# Patient Record
Sex: MALE | Race: BLACK OR AFRICAN AMERICAN | NOT HISPANIC OR LATINO | Employment: STUDENT | URBAN - METROPOLITAN AREA
[De-identification: names, ages, dates, MRNs, and addresses within clinical notes are randomized per-mention and may not be internally consistent; named-entity substitution may affect disease eponyms.]

---

## 2020-03-14 ENCOUNTER — OFFICE VISIT (OUTPATIENT)
Dept: URGENT CARE | Facility: URGENT CARE | Age: 20
End: 2020-03-14
Payer: COMMERCIAL

## 2020-03-14 ENCOUNTER — VIRTUAL VISIT (OUTPATIENT)
Dept: FAMILY MEDICINE | Facility: OTHER | Age: 20
End: 2020-03-14

## 2020-03-14 DIAGNOSIS — R05.9 COUGH: Primary | ICD-10-CM

## 2020-03-14 LAB
FLUAV+FLUBV AG SPEC QL: NEGATIVE
FLUAV+FLUBV AG SPEC QL: NEGATIVE
SPECIMEN SOURCE: NORMAL

## 2020-03-14 PROCEDURE — 87804 INFLUENZA ASSAY W/OPTIC: CPT | Performed by: NURSE PRACTITIONER

## 2020-03-14 PROCEDURE — 99202 OFFICE O/P NEW SF 15 MIN: CPT | Performed by: NURSE PRACTITIONER

## 2020-03-14 ASSESSMENT — ENCOUNTER SYMPTOMS
SORE THROAT: 1
COUGH: 1
HEADACHES: 1
CHILLS: 1

## 2020-03-14 NOTE — PATIENT INSTRUCTIONS
You are being tested for Corona Virus 19, Influenza and possibly RSV.    Please use the information at the end of this document to sign up for [facility] where you can get your results and a message about those results sent to you through the [employer] application. If you do not have mychart we will call you with your results but it may take longer.    Isolate Yourself:    Isolate yourself while traveling.    Do Not allow any visitors within 6 feet.    Do Not go to work or school.    Do Not go to [employer],  centers, shopping, or other public places.    Do Not shake hands.    Avoid close contact with others (hugging, kissing).    Protect Others:    Cover Your Mouth and Nose with a mask, disposable tissue or wash cloth to avoid spreading germs to others.    Wash your hands and face frequently with soap and water    Call Back If: Breathing difficulty develops or you become worse.    For more information about COVID19 and options for caring for yourself at home, please visit the CDC website at https://www.cdc.gov/coronavirus/2019-ncov/about/steps-when-sick.html  For more options for care at [facility], please visit our website at [email or web address]

## 2020-03-14 NOTE — PROGRESS NOTES
"Date: 2020 12:30:45  Clinician: Armida Noel  Clinician NPI: [id number]  Patient: Colby Little  Patient : 2000  Patient Address: [address] [place] [address]  Patient Phone: [phone]  Visit Protocol: URI  Patient Summary:  Colby is a 19 year old ( : 2000 ) male who initiated a Visit for COVID-19 (Coronavirus) evaluation and screening. When asked the question \"Please sign me up to receive news, health information and promotions from [employer].\", Colby responded \"Yes\".    Colby states his symptoms started 1-2 days ago.   His symptoms consist of malaise, a headache, rhinitis, myalgia, chills, a sore throat, a cough, and nasal congestion. Colby also feels feverish but was unable to measure his temperature.   Symptom details     Nasal secretions: The color of his mucus is green.    Cough: Colby coughs a few times an hour and his cough is more bothersome at night. Phlegm comes into his throat when he coughs. He does not believe his cough is caused by post-nasal drip. The color of the phlegm is green.     Sore throat: Colby reports having moderate throat pain (4-6 on a 10 point pain scale), does not have exudate on his tonsils, and can swallow liquids. He is not sure if the lymph nodes in his neck are enlarged. A rash has not appeared on the skin since the sore throat started.     Headache: He states the headache is mild (1-3 on a 10 point pain scale).      Colby denies having teeth pain, ear pain, facial pain or pressure, and wheezing. He also denies having recent facial or sinus surgery in the past 60 days, having a sinus infection within the past year, and taking antibiotic medication for the symptoms. He is not experiencing dyspnea.   Precipitating events  Colby is not sure if he has been exposed to someone with strep throat. He has not recently been exposed to someone with influenza. Colby has been in close contact with the following high risk individuals: adults 65 or " older.   Pertinent COVID-19 (Coronavirus) information  Colby has traveled internationally or to the areas where COVID-19 (Coronavirus) is widespread in the last 14 days before the start of his symptoms. Countries or locations traveled as reported by the patient (free text): [place], [place]   Colby has not had close contact with a suspected or laboratory-confirmed COVID-19 patient within 14 days of symptom onset.   Colby is not a healthcare worker or does not work in a healthcare facility.   Pertinent medical history  Colby needs a return to work/school note.   Weight: 175 lbs   Colby does not smoke or use smokeless tobacco.   Weight: 175 lbs    MEDICATIONS: No current medications, ALLERGIES: NKDA  Clinician Response:  Dear Colby,   Dear Colby Little,  Based on the information you have provided, it is recommended that you go to one of our designated Corona Virus 19 testing centers to get a test done from your car. To do this follow these instructions:  You should go to one of our dedicated testing centers as soon as possible during the hours below at one of these locations:   Walk[facility]in Care: [facility] at [address] [address]. Hours: M-F 7am - 6pm, Sat-Sun 8am -- 3pm   [facility] at [address] [address]. Hours: Every Day 9am -- 7pm  Walk-in Care: [facility] at [address] [place] [address]. Hours: M-F 7am - 6pm, Sat-Sun 8am -- 3pm  [facility] [place] [address] [place] [address]. Hours: M-F 11am -- 7pm, Sat-Sun 9am-4pm   What to expect:   When you arrive please come park in the parking lot.  Call [phone] and let them know which of the four clinics you are at, description of your car and where you are parked. Mention you did an [facility] visit and were sent for testing.  They will add you to the queue to get your test (you will stay in your car the entire time).  On that phone  call you will give them the information to register your for the visit.  You will then be met by a provider who will perform a brief assessment in your car and collect samples to send for Corona Virus 19, influenza and possibly RSV.  You will be given patient information about respiratory illnesses and instructions. You with the results if you are not on mychart.   Isolate Yourself:   Isolate yourself while traveling.  Do Not allow any visitors within 6 feet.  Do Not go to work or school.  Do Not go to [employer],  centers, shopping, or other public places.  Do Not shake hands.  Avoid close contact with others (hugging, kissing).  Protect Others:  Cover Your Mouth and Nose with a mask, disposable tissue or wash cloth to avoid spreading germs to others.  Wash your hands and face frequently with soap and water   Fever Medicines:   For fever relief, take acetaminophen or ibuprofen.  Treat fevers above 101deg F (38.3deg C) to lower fevers and make you more comfortable.   Acetaminophen (e.g., Tylenol): Take 650 mg (two 325 mg pills) by mouth every 4-6 hours as needed of regular strength Tylenol or 1,000 mg (two 500 mg pills) every 8 hours as needed of Extra Strength Tylenol.   Ibuprofen (e.g., Motrin, Advil): Take 400 mg (two 200 mg pills) by mouth every 6 hours as needed.   Acetaminophen is thought to be safer than ibuprofen or naproxen for people over 65 years old. Acetaminophen is in many OTC and prescription medicines. It might be in more than one medicine that you are taking. You need to be careful and not take an overdose. Before taking any medicine, read all the instructions on the package.  Caution -NSAIDs (e.g., ibuprofen, naproxen): Do not take nonsteroidal anti-inflammatory drugs (NSAIDs) if you have stomach problems, kidney disease, heart failure, or other contraindications to using this type of medicine. Do not take NSAID medicines for over 7 days without consulting your PCP. Do not take NSAID  medicines if you are pregnant. Do not take NSAID medicines if you are also taking blood thinners.   Call Back If: Breathing difficulty develops or you become worse.  Thank you for limiting contact with others, wearing a simple mask to cover your cough, practice good hand hygiene habits and accessing our virtual services where possible to limit the spread of this virus.  For more information about COVID19 and options for caring for yourself at home, please visit the CDC website at https://www.cdc.gov/coronavirus/2019-ncov/about/steps-when-sick.html  For more options for care at [facility], please visit our website at [email or web address]    Diagnosis: Cough  Diagnosis ICD: R05

## 2020-03-15 NOTE — PROGRESS NOTES
SUBJECTIVE: Colby Little is a 19 year old male presenting with a chief complaint of coronavirus exposure. He is COVID-19 Screened patient. Reports travel to [place] and [facility].     Review of Systems   Constitutional: Positive for chills.   HENT: Positive for sore throat.    Respiratory: Positive for cough.    Neurological: Positive for headaches.       No past medical history on file.  No family history on file.  No current outpatient medications on file.     Social History     Tobacco Use     Smoking status: Not on file   Substance Use Topics     Alcohol use: Not on file       OBJECTIVE  Physical Exam  Constitutional:       General: He is not in acute distress.     Appearance: Normal appearance. He is not ill-appearing, toxic-appearing or diaphoretic.   Pulmonary:      Effort: Pulmonary effort is normal. No respiratory distress.   Neurological:      General: No focal deficit present.      Mental Status: He is alert and oriented to person, place, and time.   Psychiatric:         Behavior: Behavior normal.           ASSESSMENT: Provided to patient    ICD-10-CM    1. Cough  R05 Influenza A & B Antigen     COVID-19 Virus (Coronavirus), PCR - Nasopharyngeal (NP) Swab in [place]        PLAN: Provided to patient.   Patient Instructions   You are being tested for Corona Virus 19, Influenza and possibly RSV.    Please use the information at the end of this document to sign up for  [employer] where you can get your results and a message about those results sent to you through the [employer] application. If you do not have [employer] we will call you with your results but it may take longer.    Isolate Yourself:    Isolate yourself while traveling.    Do Not allow any visitors within 6 feet.    Do Not go to work or school.    Do Not go to [employer],  centers, shopping, or other public places.    Do Not shake hands.    Avoid close contact with others (hugging, kissing).    Protect Others:    Cover Your Mouth  and Nose with a mask, disposable tissue or wash cloth to avoid spreading germs to others.    Wash your hands and face frequently with soap and water    Call Back If: Breathing difficulty develops or you become worse.    For more information about COVID19 and options for caring for yourself at home, please visit the CDC website at https://www.cdc.gov/coronavirus/2019-ncov/about/steps-when-sick.html  For more options for care at [facility], please visit our website at [email or web address]

## 2020-03-20 ENCOUNTER — TELEPHONE (OUTPATIENT)
Dept: URGENT CARE | Facility: URGENT CARE | Age: 20
End: 2020-03-20

## 2020-03-20 NOTE — TELEPHONE ENCOUNTER
Reason for Call:  Request for results:    Name of test or procedure: corona    Date of test of procedure: [id number]    Location of the test or procedure:     OK to leave the result message on voice mail or with a family member? YES    Phone number Patient can be reached at:  Home number on file [phone] (home)    Additional comments: none    Call taken on 3/20/2020 at 4:26 PM by Belkis Murillo

## 2020-03-23 ENCOUNTER — TELEPHONE (OUTPATIENT)
Dept: EMERGENCY MEDICINE | Facility: CLINIC | Age: 20
End: 2020-03-23

## 2020-03-23 LAB — COVID-19 VIRUS PCR RESULT FROM MDH: NEGATIVE

## 2020-03-23 NOTE — TELEPHONE ENCOUNTER
Coronavirus (COVID-19) Notification    Reason for call  Notify of Negative COVID-19 lab result, assess symptoms,  review [facility] recommendations    Lab Result    Lab test 2019-nCoV rRt-PCR  Oropharyngeal AND/OR nasopharyngeal swabs were NEGATIVE for 2019-nCoV RNA    RN Recommendations/Instructions per [facility]  Patient notified of Negative COVID-19.    Patient can discontinue Quarantee and is free to resume normal activites.      [RN/LPN Name]  Binu Tuttle RN  [facility] [employer] [facility] - [facility]  Emergency Dept Lab Result RN  Ph# [phone]

## 2020-03-23 NOTE — TELEPHONE ENCOUNTER
Coronavirus (COVID-19) Notification    Reason for call  Notify of Negative COVID-19 lab result, assess symptoms,  review [facility] recommendations    Lab Result   Lab test 2019-nCoV rRt-PCR  Oropharyngeal AND/OR nasopharyngeal swabs were NEGATIVE for 2019-nCoV RNA    Unable to reach Patient by phone at this time  Left voicemail message requesting a call back between 10A to 6:30P to [facility] Result phone line at [phone].         [RN/LPN Name]  Tammie Severson, LPN

## 2020-07-26 ENCOUNTER — APPOINTMENT (OUTPATIENT)
Dept: GENERAL RADIOLOGY | Facility: CLINIC | Age: 20
End: 2020-07-26
Attending: PHYSICIAN ASSISTANT
Payer: COMMERCIAL

## 2020-07-26 ENCOUNTER — HOSPITAL ENCOUNTER (EMERGENCY)
Facility: CLINIC | Age: 20
Discharge: HOME | End: 2020-07-26
Attending: PHYSICIAN ASSISTANT | Admitting: PHYSICIAN ASSISTANT
Payer: COMMERCIAL

## 2020-07-26 VITALS
SYSTOLIC BLOOD PRESSURE: 129 MMHG | DIASTOLIC BLOOD PRESSURE: 71 MMHG | OXYGEN SATURATION: 99 % | TEMPERATURE: 98.5 F | HEIGHT: 76 IN | WEIGHT: 187 LBS | HEART RATE: 75 BPM | RESPIRATION RATE: 16 BRPM | BODY MASS INDEX: 22.77 KG/M2

## 2020-07-26 DIAGNOSIS — M25.572 PAIN IN JOINT INVOLVING ANKLE AND FOOT, LEFT: ICD-10-CM

## 2020-07-26 PROCEDURE — 99284 EMERGENCY DEPT VISIT MOD MDM: CPT

## 2020-07-26 PROCEDURE — 73610 X-RAY EXAM OF ANKLE: CPT | Mod: LT

## 2020-07-26 PROCEDURE — 73630 X-RAY EXAM OF FOOT: CPT | Mod: LT

## 2020-07-26 PROCEDURE — 25000132 ZZH RX MED GY IP 250 OP 250 PS 637: Performed by: PHYSICIAN ASSISTANT

## 2020-07-26 RX ORDER — IBUPROFEN 600 MG/1
600 TABLET, FILM COATED ORAL ONCE
Status: COMPLETED | OUTPATIENT
Start: 2020-07-26 | End: 2020-07-26

## 2020-07-26 RX ADMIN — IBUPROFEN 600 MG: 600 TABLET ORAL at 16:10

## 2020-07-26 ASSESSMENT — MIFFLIN-ST. JEOR: SCORE: 1954.73

## 2020-07-26 ASSESSMENT — ENCOUNTER SYMPTOMS
NUMBNESS: 1
ARTHRALGIAS: 1

## 2020-07-26 NOTE — ED PROVIDER NOTES
"  History   Chief Complaint:  Ankle Pain     HPI   Colby Little is a 20 year old male who presents for evaluation of left ankle pain that occurred while playing basketball prior to arrival. The patient states he was playing basketball with his friends when he rolled his left ankle. He believes his foot inverted while he rolled his ankle. He did not fall, but he heard a \"crack\". Since this occurred, he has had left foot numbness and he has been unable to bear weight. Due to his pain, he presented for evaluation.    Here, the patient denies taking any medication for his pain prior to arrival. He denies any knee pain or symptoms prompting his presentation.     Allergies:  No Known Drug Allergies      Medications:    The patient is not currently taking any prescribed medications.     Past Medical History:    The patient denies any relevant past medical history.     Past Surgical History:    Tonsillectomy  Adenoidectomy     Family History:    The patient denies any relevant family medical history.     Social History:  The patient was accompanied to the ED by friend.  Smoking Status: Never  Smokeless Tobacco: Never  Alcohol Use: No  Drug Use: No      Review of Systems   Musculoskeletal: Positive for arthralgias.   Neurological: Positive for numbness.   All other systems reviewed and are negative.      Physical Exam     Patient Vitals for the past 24 hrs:   BP Temp Pulse Resp SpO2 Height Weight   07/26/20 1552 139/76 98.5  F (36.9  C) 86 18 98 % 1.93 m (6' 4\") 84.8 kg (187 lb)     Physical Exam  HENT: mmm  Eyes: PERRL B/L  Neck: Supple  CV: Peripheral pulses in tact and regular  Resp: Speaking in full sentences without any respiratory distress  Ext:  Left ANKLE     No TTP over the inferior 6 cm of the posterior medial malleolus  TTP over the inferior 6 cm of the posterior lateral malleolus  No TTP over the navicular.  TTP base of 5th MT  No TTP fibular head  Soft tissue swelling present over lateral malleolus  This is a " closed injury  able to fire foot/toe flexors and extensors, ankle with pain  Sensation and perfusion intact throughout foot     Remainder of the skeletal survey is unremarkable     Skin: warm dry well perfused  Neuro: Alert, no gross motor or sensory deficits  Psych: Calm  Nursing notes and vital signs reviewed.  Emergency Department Course   Imaging:  Radiology findings were communicated with the patient who voiced understanding of the findings.    XR Ankle Left G/E 3 Views  IMPRESSION: Ankle lateral swelling. Ligament injury is not excluded.  No apparent ankle or foot fracture. The ankle mortise appears  congruent.  Reading per radiology.     Foot XR, G/E 3 Views, Left   IMPRESSION: Ankle lateral swelling. Ligament injury is not excluded.  No apparent ankle or foot fracture. The ankle mortise appears  congruent.  Reading per radiology.     Interventions:  1610 Advil 600 mg PO    Emergency Department Course:  Past medical records, nursing notes, and vitals reviewed.  The patient was sent for a XR Ankle Left G/E 3 Views, Foot XR, G/E 3 Views, Left while in the emergency department, results above.      (8351)   I performed an exam of the patient as documented above. History obtained from patient.     (4173)   I rechecked the patient and discussed results and plan of care.     Findings and plan explained to the Patient. Patient discharged home with instructions regarding supportive care, medications, and reasons to return. The importance of close follow-up was reviewed. I personally reviewed the imaging results with the Patient and answered all related questions prior to discharge.     Impression & Plan     Medical Decision Making:  Colby Little is a 20 year old male who presents for evaluation of ankle pain.  Signs and symptoms are consistent with an ankle sprain.  XR of foot and ankle negative for acute bony abnormality. No signs of septic arthritis, gout, pseudogout, cellulitis, etc.   The patients neurovascular  status is normal. A head to toe trauma exam is otherwise negative; the likelihood of other serious sequelae of trauma (spine, head, chest, abdomen, other extremities, pelvis) is low.  Plan is aircast, crutches, RICE treatment with ice 15 minutes on, 1 hour off.  F/u with ortho as needed. Discussed reasons to return. All questions answered. Patient discharged to home in stable condition.    Diagnosis:    ICD-10-CM    1. Pain in joint involving ankle and foot, left  M25.572      Disposition:  Discharged to home.     Scribe Disclosure:  I, Dillan Medina, am serving as a scribe at 3:54 PM on 7/26/2020 to document services personally performed by Kirs Joyce PA-C based on my observations and the provider's statements to me.    I, Ros Cheney, am serving as a scribe at 4:45 PM on 7/26/2020 to document services personally performed by Kris Joyce PA-C based on my observations and the provider's statements to me.   July 26, 2020   [facility] EMERGENCY DEPARTMENT    This record was created at least in part using electronic voice recognition software, so please excuse any typographical errors.       Kris Joyce PA-C  07/26/20 2052

## 2020-07-26 NOTE — ED AVS SNAPSHOT
Emergency Department  [address] [facility] [address]  Phone:  [phone]  Fax:  [phone]                                    Colby Little   MRN: [id number]    Department:   Emergency Department   Date of Visit:  7/26/2020           After Visit Summary Signature Page    I have received my discharge instructions, and my questions have been answered. I have discussed any challenges I see with this plan with the nurse or doctor.    ..........................................................................................................................................  Patient/Patient Representative Signature      ..........................................................................................................................................  Patient Representative Print Name and Relationship to Patient    ..................................................               ................................................  Date                                   Time    ..........................................................................................................................................  Reviewed by Signature/Title    ...................................................              ..............................................  Date                                               Time          22EPIC Rev 08/18

## 2020-11-22 ENCOUNTER — HEALTH MAINTENANCE LETTER (OUTPATIENT)
Age: 20
End: 2020-11-22

## 2021-03-18 ENCOUNTER — HOSPITAL ENCOUNTER (EMERGENCY)
Facility: CLINIC | Age: 21
Discharge: HOME | End: 2021-03-18
Attending: EMERGENCY MEDICINE | Admitting: EMERGENCY MEDICINE
Payer: COMMERCIAL

## 2021-03-18 ENCOUNTER — APPOINTMENT (OUTPATIENT)
Dept: GENERAL RADIOLOGY | Facility: CLINIC | Age: 21
End: 2021-03-18
Attending: EMERGENCY MEDICINE
Payer: COMMERCIAL

## 2021-03-18 VITALS
SYSTOLIC BLOOD PRESSURE: 126 MMHG | DIASTOLIC BLOOD PRESSURE: 76 MMHG | HEART RATE: 76 BPM | TEMPERATURE: 98.7 F | OXYGEN SATURATION: 98 % | RESPIRATION RATE: 20 BRPM

## 2021-03-18 DIAGNOSIS — S10.93XA CONTUSION OF FACE, SCALP AND NECK, INITIAL ENCOUNTER: ICD-10-CM

## 2021-03-18 DIAGNOSIS — S00.83XA CONTUSION OF FACE, SCALP AND NECK, INITIAL ENCOUNTER: ICD-10-CM

## 2021-03-18 DIAGNOSIS — R68.84 JAW PAIN: Primary | ICD-10-CM

## 2021-03-18 DIAGNOSIS — S09.90XA CLOSED HEAD INJURY, INITIAL ENCOUNTER: ICD-10-CM

## 2021-03-18 DIAGNOSIS — S00.03XA CONTUSION OF FACE, SCALP AND NECK, INITIAL ENCOUNTER: ICD-10-CM

## 2021-03-18 PROCEDURE — 70330 X-RAY EXAM OF JAW JOINTS: CPT

## 2021-03-18 PROCEDURE — 250N000013 HC RX MED GY IP 250 OP 250 PS 637: Performed by: EMERGENCY MEDICINE

## 2021-03-18 PROCEDURE — 99283 EMERGENCY DEPT VISIT LOW MDM: CPT

## 2021-03-18 RX ORDER — ACETAMINOPHEN 500 MG
1000 TABLET ORAL ONCE
Status: COMPLETED | OUTPATIENT
Start: 2021-03-18 | End: 2021-03-18

## 2021-03-18 RX ORDER — IBUPROFEN 600 MG/1
600 TABLET, FILM COATED ORAL ONCE
Status: COMPLETED | OUTPATIENT
Start: 2021-03-18 | End: 2021-03-18

## 2021-03-18 RX ADMIN — IBUPROFEN 600 MG: 600 TABLET, FILM COATED ORAL at 14:05

## 2021-03-18 RX ADMIN — ACETAMINOPHEN 1000 MG: 500 TABLET, FILM COATED ORAL at 14:05

## 2021-03-18 ASSESSMENT — ENCOUNTER SYMPTOMS
SHORTNESS OF BREATH: 0
TROUBLE SWALLOWING: 0
WOUND: 1
NUMBNESS: 0

## 2021-03-18 NOTE — ED TRIAGE NOTES
Pt presents to ED via Triage for evaluation of Jaw Pain. Per pt, playing basketball this afternoon and was elbowed in the jaw. Pt reports laceration on left corner of mouth from biting lip. Pt reports unable to close his jaw.

## 2021-03-18 NOTE — ED PROVIDER NOTES
History   Chief Complaint:  Jaw Pain    HPI   Colby Little is a 20 year old male, who presents to the ED for evaluation of jaw pain. The patient reports he was playing basketball this afternoon and was accidentally struck in the right side of the jaw. He states since the incident he has been unable to close his mouth due to intense pain. He does not have any difficulty swallowing or breathing. He does not feel that he has any dental abnormalities nor does he have any hearing loss. The patient did not lose consciousness. He says he bit the inside of his cheek when he was hit. He has no numbness. He denies any other complaints.     Review of Systems   HENT: Negative for dental problem, hearing loss and trouble swallowing.         Jaw pain   Respiratory: Negative for shortness of breath.    Skin: Positive for wound (Inside cheek).   Neurological: Negative for syncope and numbness.   All other systems reviewed and are negative.    Allergies:  No known drug allergies    Medications:    The patient is not currently taking any prescribed medications.    Past Medical History:    The patient denies past medical history.     Past Surgical History:    The patient denies past surgical history.     Family History:    The patient denies past family history.     Social History:  PCP: [facility]  Presents to the ED alone    Physical Exam     Patient Vitals for the past 24 hrs:   BP Temp Temp src Pulse Resp SpO2   03/18/21 1257 126/76 98.7  F (37.1  C) Temporal 76 20 98 %     Physical Exam  General: Alert, appears well-developed and well-nourished. Cooperative.     In mild distress  HEENT:  Head:  Atraumatic  Ears:  External ears are normal  Mouth/Throat:  Oropharynx is without erythema or exudate and mucous membranes are moist. No dental trauma.  Mild bite injury to the left buccal mucosa.  Bleeding controlled.  No gaping laceration intraorally.  Right TMJ is mildly tender to palpation but no dislocation  appreciated clinically.  Bite appears normal.  Eyes:   Conjunctivae normal and EOM are normal. No scleral icterus.  CV:  Normal rate, regular rhythm, normal heart sounds and radial pulses are 2+ and symmetric.  No murmur.  Resp:  Breath sounds are clear bilaterally    Non-labored, no retractions or accessory muscle use  GI:  Abdomen is soft, no distension, no tenderness. No rebound or guarding.  No CVA tenderness bilaterally  MS:  Normal range of motion. No edema.    Normal strength in all 4 extremities.     Back atraumatic.    No midline cervical, thoracic, or lumbar tenderness  Skin:  Warm and dry.  No rash or lesions noted.  Neuro: Alert. Normal strength.  GCS: 15  Psych:  Normal mood and affect.    Emergency Department Course   Imaging:    XR Mandible 1/3 view:  No fractures. Temporomandibular joint alignment   bilaterally is normal. There is normal rotation and anterior   translocation of the condylar heads bilaterally with respect to the   articular eminences of the temporal bones.     Imaging independently reviewed and agree with radiologist interpretation.      Emergency Department Course:    Reviewed:  I reviewed the patient's nursing notes, vitals, past available medical records.     Assessments:  1342: I obtained history and examined the patient as noted above.     1500: I rechecked the patient and explained findings. Amendable to plan.    Interventions:  1405: Tylenol 1000 mg PO  1405: Ibuprofen 1000 mg PO     Disposition:  The patient was discharged to home.    Impression & Plan    Medical Decision Making:  Patient is a 20-year-old male who was playing basketball when he was elbowed in the right cheek.  He presents with jaw pain.  Reassuringly no loss of consciousness or syncopal event.  Patient has no dental trauma but does have a small bite injury to the buccal mucosa of the left cheek.  Reassuringly no fractured teeth.  He has no clinical dislocation at the temporomandibular joint bilaterally  although we did obtain x-rays.  Reassuringly no fractures and TMJ appears normal bilaterally.  Patient was able to pass the tongue depressor test with bite bilaterally.  We discussed use of soft foods over the next 24 to 48 hours with close follow-up with his dentist for recheck next week.  Return precautions understood and all questions answered for discharge.  Tylenol and ibuprofen for pain control as needed.  Discharged home.    Diagnosis:    ICD-10-CM    1. Jaw pain  R68.84    2. Contusion of face, scalp and neck, initial encounter  S00.83XA     S00.03XA     S10.93XA    3. Closed head injury, initial encounter  S09.90XA        Scribe Disclosure:  IClayton, am serving as a scribe at 1:42 PM on 3/18/2021 to document services personally performed by Marco A Sidhu MD based on my observations and the provider's statements to me.      Marco A Sidhu MD  03/18/21 5749

## 2021-05-02 ENCOUNTER — HOSPITAL ENCOUNTER (EMERGENCY)
Facility: CLINIC | Age: 21
Discharge: HOME | End: 2021-05-02
Attending: EMERGENCY MEDICINE | Admitting: EMERGENCY MEDICINE
Payer: COMMERCIAL

## 2021-05-02 VITALS
OXYGEN SATURATION: 100 % | RESPIRATION RATE: 16 BRPM | DIASTOLIC BLOOD PRESSURE: 74 MMHG | SYSTOLIC BLOOD PRESSURE: 118 MMHG | TEMPERATURE: 98.6 F | HEART RATE: 69 BPM

## 2021-05-02 DIAGNOSIS — R42 LIGHTHEADEDNESS: ICD-10-CM

## 2021-05-02 LAB
ANION GAP SERPL CALCULATED.3IONS-SCNC: <1 MMOL/L (ref 3–14)
BASOPHILS # BLD AUTO: 0.1 10E9/L (ref 0–0.2)
BASOPHILS NFR BLD AUTO: 1.4 %
BUN SERPL-MCNC: 14 MG/DL (ref 7–30)
CALCIUM SERPL-MCNC: 9 MG/DL (ref 8.5–10.1)
CHLORIDE SERPL-SCNC: 110 MMOL/L (ref 94–109)
CO2 SERPL-SCNC: 29 MMOL/L (ref 20–32)
CREAT SERPL-MCNC: 0.84 MG/DL (ref 0.66–1.25)
DIFFERENTIAL METHOD BLD: ABNORMAL
EOSINOPHIL # BLD AUTO: 0.3 10E9/L (ref 0–0.7)
EOSINOPHIL NFR BLD AUTO: 7.3 %
ERYTHROCYTE [DISTWIDTH] IN BLOOD BY AUTOMATED COUNT: 13.6 % (ref 10–15)
GFR SERPL CREATININE-BSD FRML MDRD: >90 ML/MIN/{1.73_M2}
GLUCOSE SERPL-MCNC: 73 MG/DL (ref 70–99)
HBA1C MFR BLD: 5 % (ref 0–5.6)
HCT VFR BLD AUTO: 44.3 % (ref 40–53)
HGB BLD-MCNC: 14.6 G/DL (ref 13.3–17.7)
IMM GRANULOCYTES # BLD: 0 10E9/L (ref 0–0.4)
IMM GRANULOCYTES NFR BLD: 0 %
LYMPHOCYTES # BLD AUTO: 1.2 10E9/L (ref 0.8–5.3)
LYMPHOCYTES NFR BLD AUTO: 35 %
MCH RBC QN AUTO: 29.1 PG (ref 26.5–33)
MCHC RBC AUTO-ENTMCNC: 33 G/DL (ref 31.5–36.5)
MCV RBC AUTO: 88 FL (ref 78–100)
MONOCYTES # BLD AUTO: 0.5 10E9/L (ref 0–1.3)
MONOCYTES NFR BLD AUTO: 12.7 %
NEUTROPHILS # BLD AUTO: 1.5 10E9/L (ref 1.6–8.3)
NEUTROPHILS NFR BLD AUTO: 43.6 %
NRBC # BLD AUTO: 0 10*3/UL
NRBC BLD AUTO-RTO: 0 /100
PLATELET # BLD AUTO: 221 10E9/L (ref 150–450)
POTASSIUM SERPL-SCNC: 4 MMOL/L (ref 3.4–5.3)
RBC # BLD AUTO: 5.01 10E12/L (ref 4.4–5.9)
SODIUM SERPL-SCNC: 138 MMOL/L (ref 133–144)
WBC # BLD AUTO: 3.5 10E9/L (ref 4–11)

## 2021-05-02 PROCEDURE — 80048 BASIC METABOLIC PNL TOTAL CA: CPT | Performed by: EMERGENCY MEDICINE

## 2021-05-02 PROCEDURE — 99283 EMERGENCY DEPT VISIT LOW MDM: CPT

## 2021-05-02 PROCEDURE — 85025 COMPLETE CBC W/AUTO DIFF WBC: CPT | Performed by: EMERGENCY MEDICINE

## 2021-05-02 PROCEDURE — 83036 HEMOGLOBIN GLYCOSYLATED A1C: CPT | Performed by: EMERGENCY MEDICINE

## 2021-05-02 ASSESSMENT — ENCOUNTER SYMPTOMS
DIARRHEA: 0
LIGHT-HEADEDNESS: 1
MYALGIAS: 0
FEVER: 0
HEADACHES: 0
CHILLS: 0
DIZZINESS: 1

## 2021-05-02 NOTE — DISCHARGE INSTRUCTIONS
We have a done lab work to screen you for dehydration low hemoglobin and diabetes and are all normal.  The cause of your lightheaded spells are unclear.  If you are dizzy or lightheaded when you stand up please get up slowly.  If you feel lightheaded sit down or lie down.  You are correct itself.  If you continue to have lightheaded spells please follow-up with your regular doctor for reassessment return with black starry stool severe abdominal constant abdominal pain or other new symptoms.

## 2021-05-02 NOTE — ED PROVIDER NOTES
History   Chief Complaint:  Dizziness       HPI   Colby Little is an otherwise healthy 21 year old male who presents with dizziness. For approximately the past four days, the patient states that he has been experiencing multiple episodes of dizziness and lightheadedness. He reports that these episodes seem to occur while trying to stand or move after sitting down. He reports that he has been drinking plenty of fluids lately but that his symptoms have not improved, prompting him to present to the emergency department for evaluation. He denies any syncope, worsening headache, diarrhea, fever, chills, myalgias, or any other symptoms. Of note, he reports a familial history of diabetes mellitus.      Review of Systems   Constitutional: Negative for chills and fever.   Gastrointestinal: Negative for diarrhea.   Musculoskeletal: Negative for myalgias.   Neurological: Positive for dizziness and light-headedness. Negative for syncope and headaches.   All other systems reviewed and are negative.      Allergies:  The patient has no known allergies.     Medications:  The patient denies taking any prescription medications.    Past Medical History:    The patient denies any past medical history including hypertension or hyperlipidemia.     Family History:    Diabetes mellitus    Social History:  The patient presents alone and denies any drug use.    Physical Exam     Patient Vitals for the past 24 hrs:   BP Temp Temp src Pulse Resp SpO2   05/02/21 1315 -- -- -- -- -- 100 %   05/02/21 1300 118/74 -- -- 69 -- 100 %   05/02/21 1245 116/71 -- -- 57 -- 100 %   05/02/21 1230 109/66 -- -- 57 -- 100 %   05/02/21 1215 126/75 -- -- 81 -- 100 %   05/02/21 1156 137/76 98.6  F (37  C) Oral 69 16 100 %       Physical Exam  Vitals signs and nursing note reviewed.   Constitutional:       Comments: Thin body habitus.  Well-appearing anxious   HENT:      Head: Normocephalic.      Right Ear: Tympanic membrane normal.      Mouth/Throat:       Mouth: Mucous membranes are moist.   Cardiovascular:      Rate and Rhythm: Normal rate and regular rhythm.   Pulmonary:      Effort: Pulmonary effort is normal.   Abdominal:      General: Abdomen is flat.      Palpations: Abdomen is soft.   Musculoskeletal: Normal range of motion.   Skin:     General: Skin is warm.      Capillary Refill: Capillary refill takes less than 2 seconds.   Neurological:      General: No focal deficit present.      Mental Status: He is alert.   Psychiatric:         Mood and Affect: Mood normal.           Emergency Department Course     Laboratory:     CBC: WBC 3.5 (L), HGB 14.6,     BMP: Chloride 110 (H), Anion Gap <1 (L), o/w WNL (Creatinine 0.84)     Hemoglobin A1c: 5.0     Emergency Department Course:    Reviewed:  I reviewed nursing notes, vitals, past medical history and care everywhere.    Assessments:  1207 I obtained history and examined the patient as noted above.     1309 I rechecked the patient and explained findings.     Disposition:  The patient was discharged to home.       Impression & Plan       Medical Decision Making:  Patient presents with a weeklong history of intermittent lightheaded spells worse with standing.  Patient seems to be concern for diabetes due to family history of diabetes without a history of his prior diagnosis.  No signs of vital sign abnormalities patient is not orthostatic lab work is sent and normal.  Patient offered reassurance and discharge suspect vasovagal symptoms and/or anxiety.  Patient is to follow-up with primary care for reassessment and return with worsening condition      Covid-19  Colby Little was evaluated during a global COVID-19 pandemic, which necessitated consideration that the patient might be at risk for infection with the SARS-CoV-2 virus that causes COVID-19.   Applicable protocols for evaluation were followed during the patient's care.     Diagnosis:    ICD-10-CM    1. Lightheadedness  R42        Scribe Disclosure:  I  Jarad Baeza, am serving as a scribe at 12:05 PM on 5/2/2021 to document services personally performed by Shane He MD based on my observations and the provider's statements to me.              Shane He MD  05/04/21 [id number]

## 2021-05-02 NOTE — ED TRIAGE NOTES
Pt sts he has been fasting for 15 days (eats a sandwich at night). Pt c/o lightheadedness for 4 days and trouble sleeping. Pt stopped fasting 4 days ago to see if he felt better, which he did not

## 2021-09-19 ENCOUNTER — HEALTH MAINTENANCE LETTER (OUTPATIENT)
Age: 21
End: 2021-09-19

## 2022-01-09 ENCOUNTER — HEALTH MAINTENANCE LETTER (OUTPATIENT)
Age: 22
End: 2022-01-09

## 2022-11-21 ENCOUNTER — HEALTH MAINTENANCE LETTER (OUTPATIENT)
Age: 22
End: 2022-11-21

## 2023-04-16 ENCOUNTER — HEALTH MAINTENANCE LETTER (OUTPATIENT)
Age: 23
End: 2023-04-16

## 2023-09-06 ENCOUNTER — APPOINTMENT (OUTPATIENT)
Dept: CT IMAGING | Facility: CLINIC | Age: 23
End: 2023-09-06
Attending: EMERGENCY MEDICINE
Payer: COMMERCIAL

## 2023-09-06 ENCOUNTER — HOSPITAL ENCOUNTER (EMERGENCY)
Facility: CLINIC | Age: 23
Discharge: HOME | End: 2023-09-06
Attending: EMERGENCY MEDICINE | Admitting: EMERGENCY MEDICINE
Payer: COMMERCIAL

## 2023-09-06 VITALS
SYSTOLIC BLOOD PRESSURE: 111 MMHG | HEART RATE: 120 BPM | RESPIRATION RATE: 20 BRPM | OXYGEN SATURATION: 99 % | TEMPERATURE: 98.4 F | DIASTOLIC BLOOD PRESSURE: 82 MMHG

## 2023-09-06 DIAGNOSIS — S01.112A LEFT EYELID LACERATION, INITIAL ENCOUNTER: ICD-10-CM

## 2023-09-06 DIAGNOSIS — S06.0X0A CONCUSSION WITHOUT LOSS OF CONSCIOUSNESS, INITIAL ENCOUNTER: ICD-10-CM

## 2023-09-06 DIAGNOSIS — H11.32 SUBCONJUNCTIVAL HEMORRHAGE OF LEFT EYE: ICD-10-CM

## 2023-09-06 PROCEDURE — 70450 CT HEAD/BRAIN W/O DYE: CPT

## 2023-09-06 PROCEDURE — 99284 EMERGENCY DEPT VISIT MOD MDM: CPT | Mod: 25

## 2023-09-07 NOTE — ED PROVIDER NOTES
History     Chief Complaint:  Eye Injury (/)     HPI   Colby Little is a 23 year old male who presents to the ED for evaluation of a left eye injury. The patient reports he was playing basketball tonight at 1930 when he was accidentally elbowed to his left eye resulting in a laceration to the left eyelid. He was knocked to the floor but did not hit his head or lose consciousness. Since the injury he has felt dizzy but denies vision changes or loss. He denies contact lens use.    Independent Historian:   None - Patient Only    Review of External Notes:   His tetanus was reviewed and is up-to-date    Medications:    Focalin  Lariam    Past Medical History:    ADHD    Past Surgical History:    No past surgical history     Physical Exam   Patient Vitals for the past 24 hrs:   BP Temp Pulse Resp SpO2   09/06/23 1951 111/82 98.4  F (36.9  C) 120 20 99 %        Physical Exam  General:  No respiratory distress    Eye: Subconjunctival hemorrhage    Cardiovascular: Good cap refill.    Respiratory: Breathing non labored.     Musculoskeletal: No tenderness. No bony deformity.     Skin: No rashes or petechiae. Laceration on left eyelid.    Neurologic: non focal      Psychiatric: Appropriate     Emergency Department Course   Imaging:  CT Head w/o Contrast   Final Result   IMPRESSION:   1.  Normal head CT.         Report per radiology    Emergency Department Course & Assessments:     Interventions:  Medications - No data to display     Assessments:  2113 Initial Examination    Independent Interpretation (X-rays, CTs, rhythm strip):  None    Consultations/Discussion of Management or Tests:  None       Disposition:  The patient was discharged to home.     Impression & Plan    CMS Diagnoses: None    Medical Decision Making:  Colby Little is a 23 year old male who was playing basketball when he elbowed in the left eye.  His gross vision is intact palpating over his eyelid does not appear to have increased intraocular pressure.   He reports some nonspecific neurologic symptoms most consistent with a concussion.  I did carefully examine him and cannot find signs to suggest an associated fracture or C-spine injury.  The patient did have a small superficial laceration transverse across the eyelid we discussed closure but I felt that it would heal fine without anything being done.  At this point the patient was discharged home with trauma instructions I told him to return if any vision loss increasing headache double vision or other symptoms.    Diagnosis:    ICD-10-CM    1. Concussion without loss of consciousness, initial encounter  S06.0X0A       2. Left eyelid laceration, initial encounter  S01.112A       3. Subconjunctival hemorrhage of left eye  H11.32            Discharge Medications:  New Prescriptions    No medications on file      Scribe Disclosure:  I, Jaleel Mg, am serving as a scribe at 9:17 PM on 9/6/2023 to document services personally performed by Clemente Parker MD based on my observations and the provider's statements to me.     9/6/2023   Clemente Parker MD Farnan, Christopher M, MD  09/06/23 8699     141

## 2023-12-22 ENCOUNTER — HOSPITAL ENCOUNTER (EMERGENCY)
Facility: CLINIC | Age: 23
Discharge: HOME | End: 2023-12-22
Attending: EMERGENCY MEDICINE | Admitting: EMERGENCY MEDICINE
Payer: COMMERCIAL

## 2023-12-22 ENCOUNTER — APPOINTMENT (OUTPATIENT)
Dept: GENERAL RADIOLOGY | Facility: CLINIC | Age: 23
End: 2023-12-22
Attending: EMERGENCY MEDICINE
Payer: COMMERCIAL

## 2023-12-22 VITALS
TEMPERATURE: 97.5 F | RESPIRATION RATE: 18 BRPM | SYSTOLIC BLOOD PRESSURE: 123 MMHG | OXYGEN SATURATION: 95 % | DIASTOLIC BLOOD PRESSURE: 83 MMHG | HEART RATE: 90 BPM

## 2023-12-22 DIAGNOSIS — S62.305A CLOSED NONDISPLACED FRACTURE OF FOURTH METACARPAL BONE OF LEFT HAND, UNSPECIFIED PORTION OF METACARPAL, INITIAL ENCOUNTER: ICD-10-CM

## 2023-12-22 PROCEDURE — 73130 X-RAY EXAM OF HAND: CPT | Mod: LT

## 2023-12-22 PROCEDURE — 99284 EMERGENCY DEPT VISIT MOD MDM: CPT | Mod: 25

## 2023-12-22 PROCEDURE — 26600 TREAT METACARPAL FRACTURE: CPT | Mod: LT

## 2023-12-22 RX ORDER — IBUPROFEN 600 MG/1
600 TABLET, FILM COATED ORAL ONCE
Status: DISCONTINUED | OUTPATIENT
Start: 2023-12-22 | End: 2023-12-23 | Stop reason: HOSPADM

## 2023-12-22 RX ORDER — IBUPROFEN 600 MG/1
600 TABLET, FILM COATED ORAL EVERY 6 HOURS PRN
Qty: 20 TABLET | Refills: 0 | Status: SHIPPED | OUTPATIENT
Start: 2023-12-22 | End: 2024-01-21

## 2023-12-22 ASSESSMENT — ACTIVITIES OF DAILY LIVING (ADL): ADLS_ACUITY_SCORE: 33

## 2023-12-23 NOTE — ED TRIAGE NOTES
Arrives from home. States was playing basketball and sustained and injury to his left hand, fourth finger is painful and difficult to move.

## 2023-12-23 NOTE — ED PROVIDER NOTES
History     Chief Complaint:  Hand Injury       HPI   Colby Little is a 23 year old male who presents with a hand injury. The patient states that he was playing basketball about two hours ago when another player hit his left hand. He states that he is having pain in his fourth left finger in the joint and there is a lot of swelling there. He states the pain is worse with movement and he is having a hard time moving the finger. He denies any numbness or tingling in the hand. Patient states that he is ambidextrous.      Independent Historian:   None - Patient Only    Review of External Notes:   12/2/23 office visit      Medications:    Adderall    Past Medical History:    ADHD    Past Surgical History:    Tonsillectomy  Adenoidectomy     Physical Exam   Patient Vitals for the past 24 hrs:   BP Temp Temp src Pulse Resp SpO2   12/22/23 2122 123/83 97.5  F (36.4  C) Temporal 90 18 95 %        Physical Exam  General: Resting comfortably   Head:  The scalp, face, and head appear normal  Eyes:  The pupils are normal    Conjunctivae and sclera appear normal  ENT:    The nose is normal  Neck:  Normal range of motion  Skin:  No rash or lesions noted.  Neuro:  Speech is normal and fluent  Psych: Awake. Alert.  Normal affect.    MSK:  L. Hand:    Tenderness to palpation at 4th metacarpal head with slight soft tissue swelling.   The finger flexors (FDS/FDP) are intact    The finger extensors are intact    The thumb exam is normal, including:    Adduction, abduction, flexion, extension, opposition    There are no sensory deficits    Median, Ulnar, and Radial nerve function is normal    Radial artery pulsations are normal    Capillary refill is normal      Emergency Department Course     Imaging:  XR Hand Left G/E 3 Views   Final Result   IMPRESSION: There is a tiny calcification projecting radial to the 4th metacarpal head which may be an age-indeterminate fracture. Clinical correlation needed. There is a small old nonunited  fracture of the radial aspect of the base of the proximal    phalanx of the long finger. Otherwise negative.         Procedures     Splint Placement     Procedure: Splint Placement     Indication: Fracture    Consent: Verbal     Location: Left fourth finger    Preparation: Wounds were cleansed and dressed with a non-adherent bandage     Procedure detail:   Splint was applied by Tech/Nurse with my assistance  Splint type: Ulnar gutter   Splint materilal: Orthoglass  After placement I checked and adjusted the fit as needed to ensure proper positioning/fit     Sensation and circulation are intact after splint placement     Patient Status: The patient tolerated the procedure well: Yes. There were no complications.    Emergency Department Course & Assessments:    Interventions:  Medications   ibuprofen (ADVIL/MOTRIN) tablet 600 mg (has no administration in time range)      Assessments:  2245 Obtained the patients history and performed initial exam  2327 Applied splint to the patient    Independent Interpretation (X-rays, CTs, rhythm strip):  L. Hand xray: avulsion fracture to 4th metacarpal head    Social Determinants of Health affecting care:   None    Disposition:  The patient was discharged to home.     Impression & Plan      Medical Decision Making:  Patient is a 23-year-old male presenting with left hand injury.  X-ray does show concerns for a nondisplaced fracture to the fourth metacarpal head.  He is neurovascularly intact.  He was placed in an ulnar gutter splint and remained neurovascularly intact.  I did encourage close outpatient orthopedic follow-up.  Monitor for increasing pain, paresthesias or should symptoms worsen or change probably represent.  Remainder of his trauma exam is unremarkable.  I encouraged Tylenol/ibuprofen for pain control as well as ice and elevation to the his upper extremity.  All questions addressed.      Diagnosis:    ICD-10-CM    1. Closed nondisplaced fracture of fourth metacarpal  bone of left hand, unspecified portion of metacarpal, initial encounter  S62.305A            Discharge Medications:  New Prescriptions    IBUPROFEN (ADVIL/MOTRIN) 600 MG TABLET    Take 1 tablet (600 mg) by mouth every 6 hours as needed for moderate pain        Scribe Disclosure:  I, Joby Feldman, am serving as a scribe at 11:13 PM on 12/22/2023 to document services personally performed by Beatriz Lopez DO based on my observations and the provider's statements to me.     12/22/2023   Beatriz Lopez DO McDonald, Lindsey E, DO  12/22/23 2586

## 2024-08-31 ENCOUNTER — HEALTH MAINTENANCE LETTER (OUTPATIENT)
Age: 24
End: 2024-08-31